# Patient Record
Sex: FEMALE | Race: WHITE | Employment: OTHER
[De-identification: names, ages, dates, MRNs, and addresses within clinical notes are randomized per-mention and may not be internally consistent; named-entity substitution may affect disease eponyms.]

---

## 2020-08-03 ENCOUNTER — TELEPHONE (OUTPATIENT)
Dept: CASE MANAGEMENT | Age: 70
End: 2020-08-03

## 2020-08-03 VITALS — WEIGHT: 135 LBS | BODY MASS INDEX: 21.69 KG/M2 | HEIGHT: 66 IN

## 2020-08-04 ENCOUNTER — HOSPITAL ENCOUNTER (OUTPATIENT)
Dept: CT IMAGING | Age: 70
Discharge: HOME | End: 2020-08-06
Payer: MEDICARE

## 2020-08-04 PROCEDURE — G0297 LDCT FOR LUNG CA SCREEN: HCPCS

## 2020-08-05 ENCOUNTER — TELEPHONE (OUTPATIENT)
Dept: CASE MANAGEMENT | Age: 70
End: 2020-08-05

## 2020-08-05 NOTE — TELEPHONE ENCOUNTER
No call, encounter opened to process CT Lung Screening. CT Lung Screen: 8/4/2020    Impression         Numerous tiny pleural and subpleural nodularities measuring a few    millimeters in size without calcification are probably benign. No    comparison studies.         Bullous emphysema.         Enlarged thyroid gland suggesting goiter. Further workup is needed to    evaluate for mass.         10 mm nodule in the right lobe of liver.         Lung - RADS Category 3 :        Pack years: 24.5    Social History     Tobacco Use  Smoking Status: Current Every Day Smoker    Start Date: 28   Quit Date:    Types: Cigarettes   Packs/Day: 0.50   Years: 49.00   Pack Years: 24.5   Smokeless Tobacco: Never Used         Results letter sent to patient via my chart or mailed.      [address]

## 2021-03-20 ENCOUNTER — IMMUNIZATION (OUTPATIENT)
Dept: PRIMARY CARE CLINIC | Age: 71
End: 2021-03-20
Payer: MEDICARE

## 2021-03-20 PROCEDURE — 91300 COVID-19, PFIZER VACCINE 30MCG/0.3ML DOSE: CPT | Performed by: INTERNAL MEDICINE

## 2021-03-20 PROCEDURE — 0001A COVID-19, PFIZER VACCINE 30MCG/0.3ML DOSE: CPT | Performed by: INTERNAL MEDICINE

## 2021-04-19 ENCOUNTER — IMMUNIZATION (OUTPATIENT)
Dept: PRIMARY CARE CLINIC | Age: 71
End: 2021-04-19
Payer: MEDICARE

## 2021-04-19 PROCEDURE — 91300 COVID-19, PFIZER VACCINE 30MCG/0.3ML DOSE: CPT | Performed by: NURSE PRACTITIONER

## 2021-04-19 PROCEDURE — 0002A COVID-19, PFIZER VACCINE 30MCG/0.3ML DOSE: CPT | Performed by: NURSE PRACTITIONER

## 2021-04-27 ENCOUNTER — HOSPITAL ENCOUNTER (OUTPATIENT)
Dept: PSYCHIATRY | Age: 71
Discharge: HOME | End: 2021-04-27
Payer: MEDICARE

## 2021-04-27 NOTE — FLOWSHEET NOTE
Assessment completed. Client provided verbal consent to participate in the tobacco program via telehealth.     Electronically signed by Reji Luna on 4/27/2021 at 11:22 AM

## 2022-08-01 ENCOUNTER — HOSPITAL ENCOUNTER (OUTPATIENT)
Dept: CT IMAGING | Age: 72
Discharge: HOME | End: 2022-08-03
Payer: MEDICARE

## 2022-08-01 DIAGNOSIS — R91.1 PULMONARY NODULE: ICD-10-CM

## 2022-08-01 PROCEDURE — 71250 CT THORAX DX C-: CPT

## 2022-09-13 ENCOUNTER — HOSPITAL ENCOUNTER (OUTPATIENT)
Dept: NUCLEAR MEDICINE | Age: 72
Discharge: HOME | End: 2022-09-15
Payer: MEDICARE

## 2022-09-13 DIAGNOSIS — R91.1 PULMONARY NODULE: ICD-10-CM

## 2022-09-13 PROCEDURE — A9552 F18 FDG: HCPCS | Performed by: RADIOLOGY

## 2022-09-13 PROCEDURE — 78814 PET IMAGE W/CT LMTD: CPT

## 2022-09-13 PROCEDURE — 3430000000 HC RX DIAGNOSTIC RADIOPHARMACEUTICAL: Performed by: RADIOLOGY

## 2022-09-13 RX ORDER — FLUDEOXYGLUCOSE F 18 200 MCI/ML
15.3 INJECTION, SOLUTION INTRAVENOUS
Status: COMPLETED | OUTPATIENT
Start: 2022-09-13 | End: 2022-09-13

## 2022-09-13 RX ADMIN — FLUDEOXYGLUCOSE F 18 15.3 MILLICURIE: 200 INJECTION, SOLUTION INTRAVENOUS at 09:18

## 2022-11-18 ENCOUNTER — HOSPITAL ENCOUNTER (OUTPATIENT)
Dept: ULTRASOUND IMAGING | Age: 72
Discharge: HOME | End: 2022-11-20
Payer: MEDICARE

## 2022-11-18 DIAGNOSIS — E04.1 THYROID NODULE: ICD-10-CM

## 2022-11-18 PROCEDURE — 10005 FNA BX W/US GDN 1ST LES: CPT

## 2022-11-18 PROCEDURE — 10006 FNA BX W/US GDN EA ADDL: CPT

## 2022-11-18 PROCEDURE — 10005 FNA BX W/US GDN 1ST LES: CPT | Performed by: RADIOLOGY

## 2022-11-18 PROCEDURE — 88173 CYTOPATH EVAL FNA REPORT: CPT

## 2022-11-18 PROCEDURE — 10006 FNA BX W/US GDN EA ADDL: CPT | Performed by: RADIOLOGY

## 2022-11-18 PROCEDURE — 88305 TISSUE EXAM BY PATHOLOGIST: CPT

## 2022-11-18 NOTE — BRIEF OP NOTE
Brief Postoperative Note    Kristal Penaloza  YOB: 1950  [id number]    Pre-operative Diagnosis: bilateral thyroid mass plan bx    Post-operative Diagnosis: Same    Procedure: biopsy    Estimated Blood Loss: < 10 cc    Surgeon: Enolia Sandhoff D.     Complications: none    Specimens obtained: Yes, biopsy of mass    Findings: biopsy of a mass      Inocente Phelan II, MD   11/18/2022 10:05 AM

## 2022-11-18 NOTE — H&P
Interventional Radiology  Attending Pre-operative History and Physical    DIAGNOSIS:  There is no problem list on file for this patient. CHIEF COMPLAINT: <principal problem not specified>        No current outpatient medications on file. No Known Allergies    No past medical history on file. No past surgical history on file. No family history on file. Social History     Socioeconomic History    Marital status:      Spouse name: Not on file    Number of children: Not on file    Years of education: Not on file    Highest education level: Not on file   Occupational History    Not on file   Tobacco Use    Smoking status: Every Day     Packs/day: 0.50     Years: 49.00     Pack years: 24.50     Types: Cigarettes     Start date: 26    Smokeless tobacco: Never   Substance and Sexual Activity    Alcohol use: Not on file    Drug use: Not on file    Sexual activity: Not on file   Other Topics Concern    Not on file   Social History Narrative    Not on file     Social Determinants of Health     Financial Resource Strain: Not on file   Food Insecurity: Not on file   Transportation Needs: Not on file   Physical Activity: Not on file   Stress: Not on file   Social Connections: Not on file   Intimate Partner Violence: Not on file   Housing Stability: Not on file       ROS: Non-contributory other than as noted above    PHYSICAL EXAM:      Heart and Lungs:  demonstrate no contraindications to proceed    DATA:  CBC: No results found for: WBC, RBC, HGB, HCT, MCV, MCH, MCHC, RDW, PLT, MPV  CBC with Differential:  No results found for: WBC, RBC, HGB, HCT, PLT, MCV, MCH, MCHC, RDW, NRBC, SEGSPCT, BANDSPCT, BLASTSPCT, METASPCT, LYMPHOPCT, PROMYELOPCT, MONOPCT, MYELOPCT, EOSPCT, BASOPCT, MONOSABS, LYMPHSABS, EOSABS, BASOSABS, DIFFTYPE  Platelets:  No results found for: PLT  BUN/Creatinine:  No results found for: BUN, CREATININE    ASSESSMENT AND PLAN:  1.   bilateral thyroid mass plan bx  2.   Procedure options, risks and benefits reviewed with patient. Patient expresses understanding.     Electronically signed by Yessi Castellanos II, MD on 11/18/2022 at 10:05 AM

## 2023-01-06 ENCOUNTER — HOSPITAL ENCOUNTER (OUTPATIENT)
Dept: CT IMAGING | Age: 73
End: 2023-01-06
Payer: MEDICARE

## 2023-01-06 DIAGNOSIS — R91.1 PULMONARY NODULE: ICD-10-CM

## 2023-01-06 PROCEDURE — 71250 CT THORAX DX C-: CPT

## 2023-04-20 ENCOUNTER — HOSPITAL ENCOUNTER (OUTPATIENT)
Dept: CT IMAGING | Age: 73
Discharge: HOME | End: 2023-04-22
Payer: MEDICARE

## 2023-04-20 DIAGNOSIS — R91.1 PULMONARY NODULE: ICD-10-CM

## 2023-04-20 PROCEDURE — 71250 CT THORAX DX C-: CPT

## 2023-05-12 ENCOUNTER — HOSPITAL ENCOUNTER (OUTPATIENT)
Dept: PREADMISSION TESTING | Age: 73
Discharge: HOME | End: 2023-05-12
Payer: MEDICARE

## 2023-05-12 VITALS
HEART RATE: 58 BPM | OXYGEN SATURATION: 96 % | BODY MASS INDEX: 26.68 KG/M2 | TEMPERATURE: 97.6 F | RESPIRATION RATE: 18 BRPM | DIASTOLIC BLOOD PRESSURE: 71 MMHG | WEIGHT: 166 LBS | HEIGHT: 66 IN | SYSTOLIC BLOOD PRESSURE: 120 MMHG

## 2023-05-12 DIAGNOSIS — Z01.818 PREOP TESTING: Primary | ICD-10-CM

## 2023-05-12 LAB
EKG ATRIAL RATE: 60 BPM
EKG P AXIS: 25 DEGREES
EKG P-R INTERVAL: 128 MS
EKG Q-T INTERVAL: 442 MS
EKG QRS DURATION: 72 MS
EKG QTC CALCULATION (BAZETT): 442 MS
EKG R AXIS: -15 DEGREES
EKG T AXIS: 11 DEGREES
EKG VENTRICULAR RATE: 60 BPM

## 2023-05-12 PROCEDURE — 93005 ELECTROCARDIOGRAM TRACING: CPT | Performed by: ANESTHESIOLOGY

## 2023-05-12 RX ORDER — PAROXETINE 10 MG/1
10 TABLET, FILM COATED ORAL EVERY MORNING
COMMUNITY

## 2023-05-12 NOTE — PROGRESS NOTES
body piercing jewelry must be removed. Shower the night before surgery with ___Antibacterial soap /RACHEL WIPES________        If you have a Living Will and Durable Power of  for Healthcare, please bring in a copy. If appropriate bring crutches, inspirex, WALKER, CANE etc... Notify your Surgeon if you develop any illness between now and surgery time, cough, cold, fever, sore throat, nausea, vomiting, etc.  Please notify your surgeon if you experience dizziness, shortness of breath or blurred vision between now & the time of your surgery. If you have ___dentures, they will be removed before going to the OR; we will provide you a container. If you wear ___contact lenses or ___glasses, they will be removed; please bring a case for them. To provide excellent care visitors will be limited to 1 in the room at any given time. During flu season no children under the age of 15 are permitted in the hospital for the safety of all patients. Other    Any implantable device requiring remote therapy, Please bring remote day of surgery and bring your implant card with you! Please call AMBULATORY CARE if you have any further questions.    [address]     [address]

## 2023-05-18 ENCOUNTER — ANESTHESIA EVENT (OUTPATIENT)
Dept: OPERATING ROOM | Age: 73
End: 2023-05-18
Payer: MEDICARE

## 2023-05-18 ENCOUNTER — HOSPITAL ENCOUNTER (OUTPATIENT)
Age: 73
Setting detail: OUTPATIENT SURGERY
Discharge: HOME | End: 2023-05-18
Attending: ORTHOPAEDIC SURGERY | Admitting: ORTHOPAEDIC SURGERY
Payer: MEDICARE

## 2023-05-18 ENCOUNTER — ANESTHESIA (OUTPATIENT)
Dept: OPERATING ROOM | Age: 73
End: 2023-05-18
Payer: MEDICARE

## 2023-05-18 VITALS
BODY MASS INDEX: 26.68 KG/M2 | OXYGEN SATURATION: 94 % | RESPIRATION RATE: 15 BRPM | HEART RATE: 52 BPM | WEIGHT: 166 LBS | TEMPERATURE: 97.6 F | HEIGHT: 66 IN | SYSTOLIC BLOOD PRESSURE: 125 MMHG | DIASTOLIC BLOOD PRESSURE: 75 MMHG

## 2023-05-18 DIAGNOSIS — S83.209A ACUTE TORN MENISCUS: Primary | ICD-10-CM

## 2023-05-18 PROCEDURE — 3600000003 HC SURGERY LEVEL 3 BASE: Performed by: ORTHOPAEDIC SURGERY

## 2023-05-18 PROCEDURE — 3700000000 HC ANESTHESIA ATTENDED CARE: Performed by: ORTHOPAEDIC SURGERY

## 2023-05-18 PROCEDURE — 2580000003 HC RX 258: Performed by: ORTHOPAEDIC SURGERY

## 2023-05-18 PROCEDURE — 2709999900 HC NON-CHARGEABLE SUPPLY: Performed by: ORTHOPAEDIC SURGERY

## 2023-05-18 PROCEDURE — 3600000013 HC SURGERY LEVEL 3 ADDTL 15MIN: Performed by: ORTHOPAEDIC SURGERY

## 2023-05-18 PROCEDURE — 6360000002 HC RX W HCPCS: Performed by: ORTHOPAEDIC SURGERY

## 2023-05-18 PROCEDURE — 6360000002 HC RX W HCPCS: Performed by: NURSE ANESTHETIST, CERTIFIED REGISTERED

## 2023-05-18 PROCEDURE — 2500000003 HC RX 250 WO HCPCS: Performed by: ORTHOPAEDIC SURGERY

## 2023-05-18 PROCEDURE — 6360000002 HC RX W HCPCS

## 2023-05-18 PROCEDURE — 7100000000 HC PACU RECOVERY - FIRST 15 MIN: Performed by: ORTHOPAEDIC SURGERY

## 2023-05-18 PROCEDURE — 7100000001 HC PACU RECOVERY - ADDTL 15 MIN: Performed by: ORTHOPAEDIC SURGERY

## 2023-05-18 PROCEDURE — 3700000001 HC ADD 15 MINUTES (ANESTHESIA): Performed by: ORTHOPAEDIC SURGERY

## 2023-05-18 PROCEDURE — 6360000002 HC RX W HCPCS: Performed by: ANESTHESIOLOGY

## 2023-05-18 PROCEDURE — 7100000010 HC PHASE II RECOVERY - FIRST 15 MIN: Performed by: ORTHOPAEDIC SURGERY

## 2023-05-18 PROCEDURE — 7100000011 HC PHASE II RECOVERY - ADDTL 15 MIN: Performed by: ORTHOPAEDIC SURGERY

## 2023-05-18 PROCEDURE — 2580000003 HC RX 258: Performed by: NURSE ANESTHETIST, CERTIFIED REGISTERED

## 2023-05-18 RX ORDER — SODIUM CHLORIDE, SODIUM LACTATE, POTASSIUM CHLORIDE, CALCIUM CHLORIDE 600; 310; 30; 20 MG/100ML; MG/100ML; MG/100ML; MG/100ML
INJECTION, SOLUTION INTRAVENOUS CONTINUOUS PRN
Status: DISCONTINUED | OUTPATIENT
Start: 2023-05-18 | End: 2023-05-18 | Stop reason: SDUPTHER

## 2023-05-18 RX ORDER — KETOROLAC TROMETHAMINE 30 MG/ML
INJECTION, SOLUTION INTRAMUSCULAR; INTRAVENOUS PRN
Status: DISCONTINUED | OUTPATIENT
Start: 2023-05-18 | End: 2023-05-18 | Stop reason: SDUPTHER

## 2023-05-18 RX ORDER — ONDANSETRON 2 MG/ML
4 INJECTION INTRAMUSCULAR; INTRAVENOUS
Status: DISCONTINUED | OUTPATIENT
Start: 2023-05-18 | End: 2023-05-18 | Stop reason: HOSPADM

## 2023-05-18 RX ORDER — FENTANYL CITRATE 50 UG/ML
50 INJECTION, SOLUTION INTRAMUSCULAR; INTRAVENOUS
Status: COMPLETED | OUTPATIENT
Start: 2023-05-18 | End: 2023-05-18

## 2023-05-18 RX ORDER — DEXAMETHASONE SODIUM PHOSPHATE 10 MG/ML
INJECTION, SOLUTION INTRAMUSCULAR; INTRAVENOUS PRN
Status: DISCONTINUED | OUTPATIENT
Start: 2023-05-18 | End: 2023-05-18 | Stop reason: SDUPTHER

## 2023-05-18 RX ORDER — LIDOCAINE HYDROCHLORIDE 20 MG/ML
INJECTION, SOLUTION INTRAVENOUS PRN
Status: DISCONTINUED | OUTPATIENT
Start: 2023-05-18 | End: 2023-05-18 | Stop reason: SDUPTHER

## 2023-05-18 RX ORDER — CEFAZOLIN 2 G/1
INJECTION, POWDER, FOR SOLUTION INTRAMUSCULAR; INTRAVENOUS
Status: DISCONTINUED
Start: 2023-05-18 | End: 2023-05-18 | Stop reason: HOSPADM

## 2023-05-18 RX ORDER — SCOLOPAMINE TRANSDERMAL SYSTEM 1 MG/1
PATCH, EXTENDED RELEASE TRANSDERMAL
Status: DISCONTINUED
Start: 2023-05-18 | End: 2023-05-18 | Stop reason: HOSPADM

## 2023-05-18 RX ORDER — MEPERIDINE HYDROCHLORIDE 25 MG/ML
12.5 INJECTION INTRAMUSCULAR; INTRAVENOUS; SUBCUTANEOUS EVERY 5 MIN PRN
Status: DISCONTINUED | OUTPATIENT
Start: 2023-05-18 | End: 2023-05-18 | Stop reason: HOSPADM

## 2023-05-18 RX ORDER — ONDANSETRON 2 MG/ML
INJECTION INTRAMUSCULAR; INTRAVENOUS PRN
Status: DISCONTINUED | OUTPATIENT
Start: 2023-05-18 | End: 2023-05-18 | Stop reason: SDUPTHER

## 2023-05-18 RX ORDER — HYDROCODONE BITARTRATE AND ACETAMINOPHEN 5; 325 MG/1; MG/1
1 TABLET ORAL EVERY 8 HOURS PRN
Qty: 21 TABLET | Refills: 0 | Status: SHIPPED | OUTPATIENT
Start: 2023-05-18 | End: 2023-05-25

## 2023-05-18 RX ORDER — PROCHLORPERAZINE EDISYLATE 5 MG/ML
5 INJECTION INTRAMUSCULAR; INTRAVENOUS
Status: DISCONTINUED | OUTPATIENT
Start: 2023-05-18 | End: 2023-05-18 | Stop reason: HOSPADM

## 2023-05-18 RX ORDER — SODIUM CHLORIDE 0.9 % (FLUSH) 0.9 %
5-40 SYRINGE (ML) INJECTION EVERY 12 HOURS SCHEDULED
Status: DISCONTINUED | OUTPATIENT
Start: 2023-05-18 | End: 2023-05-18 | Stop reason: HOSPADM

## 2023-05-18 RX ORDER — POVIDONE-IODINE 10 MG/G
OINTMENT TOPICAL PRN
Status: DISCONTINUED | OUTPATIENT
Start: 2023-05-18 | End: 2023-05-18 | Stop reason: HOSPADM

## 2023-05-18 RX ORDER — SCOLOPAMINE TRANSDERMAL SYSTEM 1 MG/1
1 PATCH, EXTENDED RELEASE TRANSDERMAL
Status: CANCELLED | OUTPATIENT
Start: 2023-05-18

## 2023-05-18 RX ORDER — SODIUM CHLORIDE 9 MG/ML
INJECTION, SOLUTION INTRAVENOUS PRN
Status: DISCONTINUED | OUTPATIENT
Start: 2023-05-18 | End: 2023-05-18 | Stop reason: HOSPADM

## 2023-05-18 RX ORDER — PROPOFOL 10 MG/ML
INJECTION, EMULSION INTRAVENOUS PRN
Status: DISCONTINUED | OUTPATIENT
Start: 2023-05-18 | End: 2023-05-18 | Stop reason: SDUPTHER

## 2023-05-18 RX ORDER — MIDAZOLAM HYDROCHLORIDE 1 MG/ML
INJECTION INTRAMUSCULAR; INTRAVENOUS PRN
Status: DISCONTINUED | OUTPATIENT
Start: 2023-05-18 | End: 2023-05-18 | Stop reason: SDUPTHER

## 2023-05-18 RX ORDER — SODIUM CHLORIDE 0.9 % (FLUSH) 0.9 %
5-40 SYRINGE (ML) INJECTION PRN
Status: DISCONTINUED | OUTPATIENT
Start: 2023-05-18 | End: 2023-05-18 | Stop reason: HOSPADM

## 2023-05-18 RX ORDER — SODIUM CHLORIDE 9 MG/ML
INJECTION, SOLUTION INTRAVENOUS CONTINUOUS
Status: DISCONTINUED | OUTPATIENT
Start: 2023-05-18 | End: 2023-05-18 | Stop reason: HOSPADM

## 2023-05-18 RX ORDER — FENTANYL CITRATE 50 UG/ML
INJECTION, SOLUTION INTRAMUSCULAR; INTRAVENOUS
Status: COMPLETED
Start: 2023-05-18 | End: 2023-05-18

## 2023-05-18 RX ORDER — FENTANYL CITRATE 50 UG/ML
INJECTION, SOLUTION INTRAMUSCULAR; INTRAVENOUS PRN
Status: DISCONTINUED | OUTPATIENT
Start: 2023-05-18 | End: 2023-05-18 | Stop reason: SDUPTHER

## 2023-05-18 RX ORDER — LIDOCAINE HYDROCHLORIDE 10 MG/ML
INJECTION, SOLUTION EPIDURAL; INFILTRATION; INTRACAUDAL; PERINEURAL PRN
Status: DISCONTINUED | OUTPATIENT
Start: 2023-05-18 | End: 2023-05-18 | Stop reason: HOSPADM

## 2023-05-18 RX ADMIN — CEFAZOLIN 2000 MG: 2 INJECTION, POWDER, FOR SOLUTION INTRAMUSCULAR; INTRAVENOUS at 09:47

## 2023-05-18 RX ADMIN — FENTANYL CITRATE 50 MCG: 50 INJECTION, SOLUTION INTRAMUSCULAR; INTRAVENOUS at 11:03

## 2023-05-18 RX ADMIN — SODIUM CHLORIDE, POTASSIUM CHLORIDE, SODIUM LACTATE AND CALCIUM CHLORIDE: 600; 310; 30; 20 INJECTION, SOLUTION INTRAVENOUS at 10:31

## 2023-05-18 RX ADMIN — FENTANYL CITRATE 100 MCG: 50 INJECTION, SOLUTION INTRAMUSCULAR; INTRAVENOUS at 09:49

## 2023-05-18 RX ADMIN — DEXAMETHASONE SODIUM PHOSPHATE 10 MG: 10 INJECTION, SOLUTION INTRAMUSCULAR; INTRAVENOUS at 09:49

## 2023-05-18 RX ADMIN — FENTANYL CITRATE 50 MCG: 50 INJECTION, SOLUTION INTRAMUSCULAR; INTRAVENOUS at 11:23

## 2023-05-18 RX ADMIN — LIDOCAINE HYDROCHLORIDE 50 MG: 20 INJECTION, SOLUTION INTRAVENOUS at 09:49

## 2023-05-18 RX ADMIN — KETOROLAC TROMETHAMINE 30 MG: 30 INJECTION, SOLUTION INTRAMUSCULAR; INTRAVENOUS at 10:31

## 2023-05-18 RX ADMIN — MIDAZOLAM 2 MG: 1 INJECTION INTRAMUSCULAR; INTRAVENOUS at 09:40

## 2023-05-18 RX ADMIN — SODIUM CHLORIDE: 9 INJECTION, SOLUTION INTRAVENOUS at 09:12

## 2023-05-18 RX ADMIN — PROPOFOL 150 MG: 10 INJECTION, EMULSION INTRAVENOUS at 09:49

## 2023-05-18 RX ADMIN — ONDANSETRON 4 MG: 2 INJECTION INTRAMUSCULAR; INTRAVENOUS at 10:30

## 2023-05-18 ASSESSMENT — PAIN DESCRIPTION - DESCRIPTORS
DESCRIPTORS: ACHING;BURNING
DESCRIPTORS: ACHING;BURNING

## 2023-05-18 ASSESSMENT — PAIN SCALES - GENERAL
PAINLEVEL_OUTOF10: 7
PAINLEVEL_OUTOF10: 0
PAINLEVEL_OUTOF10: 8

## 2023-05-18 ASSESSMENT — PAIN - FUNCTIONAL ASSESSMENT: PAIN_FUNCTIONAL_ASSESSMENT: 0-10

## 2023-05-18 ASSESSMENT — PAIN DESCRIPTION - ORIENTATION: ORIENTATION: LEFT

## 2023-05-18 ASSESSMENT — PAIN DESCRIPTION - LOCATION
LOCATION: KNEE
LOCATION: KNEE

## 2023-05-18 NOTE — ANESTHESIA POSTPROCEDURE EVALUATION
Department of Anesthesiology  Postprocedure Note    Patient: Roxane Aguilera  MRN: [id number]  YOB: 1950  Date of evaluation: 5/18/2023      Procedure Summary     Date: 05/18/23 Room / Location: [address] [place] [address]    Anesthesia Start: 9609 Anesthesia Stop: 0899    Procedure: LEFT KNEE ARTHROSCOPY WITH REPAIR  MEDIAL MENISCECTOMY (Left) Diagnosis:       Complex tear of medial meniscus of left knee as current injury, initial encounter      (Complex tear of medial meniscus of left knee as current injury, initial encounter [P99.839M])    Surgeons: Yulissa Benjamin DO Responsible Provider: Sara Leonard MD    Anesthesia Type: General ASA Status: 2          Anesthesia Type: General    Dolly Phase I: Dolly Score: 9    Dolly Phase II:        Anesthesia Post Evaluation    Patient location during evaluation: PACU  Patient participation: complete - patient participated  Level of consciousness: awake  Pain score: 4  Airway patency: patent  Nausea & Vomiting: no nausea  Complications: no  Cardiovascular status: blood pressure returned to baseline  Respiratory status: acceptable  Hydration status: euvolemic

## 2023-05-18 NOTE — H&P
Department of Orthopedic Surgery   History & Physical Exam      CHIEF COMPLAINT: Left knee pain      HISTORY OF PRESENT ILLNESS:                The patient is a 81 y.o. female who presents with left knee pain that started several months ago. She states she was getting her mother out of a [employer] lift when she felt a pop in the knee. She states she has had constant pain since that time. She states that the pain has progressively worsened. She complains of pain over the medial joint line. She says it is worse with stairs. She has failed conservative treatment. Past Medical History:        Diagnosis Date    COPD (chronic obstructive pulmonary disease) ([facility] [address]      Past Surgical History:        Procedure Laterality Date    COLONOSCOPY      TONSILLECTOMY      WISDOM TOOTH EXTRACTION       Current Medications:   Current Facility-Administered Medications: 0.9 % sodium chloride infusion, , IntraVENous, Continuous  sodium chloride flush 0.9 % injection 5-40 mL, 5-40 mL, IntraVENous, 2 times per day  sodium chloride flush 0.9 % injection 5-40 mL, 5-40 mL, IntraVENous, PRN  0.9 % sodium chloride infusion, , IntraVENous, PRN  ceFAZolin (ANCEF) 2,000 mg in sterile water 20 mL IV syringe, 2,000 mg, IntraVENous, On Call to OR  povidone-iodine (BETADINE) 10 % ointment, , Topical, PRN  ceFAZolin (ANCEF) 2 g injection, , ,   Allergies:  Patient has no known allergies. Social History:   TOBACCO:   reports that she has quit smoking. Her smoking use included cigarettes. She started smoking about 52 years ago. She has a 24.50 pack-year smoking history. She has never used smokeless tobacco.  ETOH:   reports no history of alcohol use. DRUGS:   reports no history of drug use. ACTIVITIES OF DAILY LIVING:    OCCUPATION:    Family History:   History reviewed. No pertinent family history.     REVIEW OF SYSTEMS:   Skin: no abnormal pigmentation, rash  Eyes: no blurring or eye pain   Ears/Nose/Throat: no hearing loss,

## 2023-05-18 NOTE — ANESTHESIA PRE PROCEDURE
Department of Anesthesiology  Preprocedure Note       Name:  Be Yu   Age:  81 y.o.  :  1950                                          MRN:  [id number]         Date:  2023      Surgeon: Laurence Castillo):  Delonte Johnson DO    Procedure: Procedure(s):  LEFT KNEE ARTHROSCOPY WITH REPAIR  MEDIAL MENISCECTOMY    Medications prior to admission:   Prior to Admission medications    Medication Sig Start Date End Date Taking? Authorizing Provider   PARoxetine (PAXIL) 10 MG tablet Take 1 tablet by mouth every morning    Historical Provider, MD       Current medications:    Current Facility-Administered Medications   Medication Dose Route Frequency Provider Last Rate Last Admin    0.9 % sodium chloride infusion   IntraVENous Continuous Delonte Johnson  mL/hr at 23 0912 New Bag at 23 0912    sodium chloride flush 0.9 % injection 5-40 mL  5-40 mL IntraVENous 2 times per day Delonte Johnson DO        sodium chloride flush 0.9 % injection 5-40 mL  5-40 mL IntraVENous PRN Delonte Johnson DO        0.9 % sodium chloride infusion   IntraVENous PRN Delonte Johnson DO        ceFAZolin (ANCEF) 2,000 mg in sterile water 20 mL IV syringe  2,000 mg IntraVENous On Call to [facility], DO        povidone-iodine (BETADINE) 10 % ointment   Topical PRN Delonte Johnson DO        ceFAZolin (ANCEF) 2 g injection                Allergies:  No Known Allergies    Problem List:  There is no problem list on file for this patient.       Past Medical History:        Diagnosis Date    COPD (chronic obstructive pulmonary disease) ([facility] [address]        Past Surgical History:        Procedure Laterality Date    COLONOSCOPY      TONSILLECTOMY      WISDOM TOOTH EXTRACTION         Social History:    Social History     Tobacco Use    Smoking status: Former     Packs/day: 0.50     Years: 49.00     Pack years: 24.50     Types: Cigarettes     Start date:     Smokeless tobacco:

## 2023-05-18 NOTE — PROGRESS NOTES
CLINICAL PHARMACY NOTE: MEDS TO BEDS    Total # of Prescriptions Filled: 1   The following medications were delivered to the patient:  Norco 5-325 mg    Additional Documentation:'

## 2023-05-18 NOTE — DISCHARGE INSTRUCTIONS
Orthopedics Discharge Instructions   Weight bearing Status - Weight bearing as tolerated - Operative Extermity  Pain medication Per Prescription  Ice to operative/injured site for 15-30 minutes of each hour for next 3-5 days    Elevate operative/injured limb on 2 pillows at home  Continue DVT Prophylaxis as Prescribed  Wound care - Leave dressing in place until follow up  Follow Up in Office in 2 weeks with Dr. Sammy Woods    Call the office for directions or with any questions. Watch for these significant complications. Call physician if they or any other problems occur:  Fever over 101°, redness, swelling or warmth at the operative site  Unrelieved nausea    Foul smelling or cloudy drainage at the operative site   Unrelieved pain    Blood soaked dressing.  (Some oozing may be normal)     Numb, pale, blue, cold or tingling extremity

## 2023-05-19 NOTE — OP NOTE
[address]                                OPERATIVE REPORT    PATIENT NAME: Mc Stewart                  :        1950  MED REC NO:   [id number]                            ROOM:  ACCOUNT NO:   [de-identified]                           ADMIT DATE: 2023  PROVIDER:     Rock Kiser    DATE OF PROCEDURE:  2023    PREOPERATIVE DIAGNOSIS:  Torn meniscus, left knee. POSTOPERATIVE DIAGNOSES:  1. Torn meniscus, left knee. 2.  Synovitis. 3.  Medial and lateral femoral condyle abrasions. OPERATIONS:  1. Arthroscopy of left knee with partial complex subtotal medial and  lateral meniscectomies. 2.  Arthroscopic synovectomy of the medial and lateral compartments of  the left knee. 3.  Arthroscopic debridement of medial and lateral femoral condyle  abrasions of the left knee in chondroplasty fashion. SURGEON:  Rock Rashel DO    OPERATIVE PROCEDURE:  The patient was brought to the operation theater  where a general anesthetic was induced by Department of Anesthesiology. The left knee, leg and foot were prepped and draped in the usual sterile  fashion. The arthroscope was inserted through the inferolateral portal  and the knee joint was distended using lactated Ringer's. We encountered  a large 20-25 mL yellowish effusion upon entering the left knee joint  today. Egress drainage was established through the superomedial portal.  The knee joint was then inspected in a careful and orderly fashion  beginning at the suprapatellar pouch, medial and lateral gutters, and  patellofemoral joint. No bone chips or plica were seen in the  suprapatellar pouch upon inspection today. The kneecap was mildly  arthritic given the patient's age group. ACL and PCL were intact.   She  had complex tears in the medial and lateral meniscus as well as quite a  bit of synovitis in the knee joint today,

## 2023-12-26 ENCOUNTER — OFFICE VISIT (OUTPATIENT)
Dept: FAMILY MEDICINE CLINIC | Age: 73
End: 2023-12-26
Payer: MEDICARE

## 2023-12-26 VITALS
HEART RATE: 66 BPM | TEMPERATURE: 96.9 F | WEIGHT: 166 LBS | HEIGHT: 66 IN | DIASTOLIC BLOOD PRESSURE: 80 MMHG | OXYGEN SATURATION: 96 % | RESPIRATION RATE: 16 BRPM | SYSTOLIC BLOOD PRESSURE: 110 MMHG | BODY MASS INDEX: 26.68 KG/M2

## 2023-12-26 DIAGNOSIS — R09.81 SINUS CONGESTION: ICD-10-CM

## 2023-12-26 DIAGNOSIS — J45.901 ACUTE BRONCHITIS WITH ASTHMA WITH ACUTE EXACERBATION: Primary | ICD-10-CM

## 2023-12-26 DIAGNOSIS — R05.1 ACUTE COUGH: ICD-10-CM

## 2023-12-26 DIAGNOSIS — J20.9 ACUTE BRONCHITIS WITH ASTHMA WITH ACUTE EXACERBATION: Primary | ICD-10-CM

## 2023-12-26 LAB
INFLUENZA A ANTIGEN, POC: NEGATIVE
INFLUENZA B ANTIGEN, POC: NEGATIVE
LOT EXPIRE DATE: NORMAL
LOT KIT NUMBER: NORMAL
SARS-COV-2, POC: NORMAL
VALID INTERNAL CONTROL: NORMAL
VENDOR AND KIT NAME POC: NORMAL

## 2023-12-26 PROCEDURE — 87428 SARSCOV & INF VIR A&B AG IA: CPT | Performed by: NURSE PRACTITIONER

## 2023-12-26 PROCEDURE — 1123F ACP DISCUSS/DSCN MKR DOCD: CPT | Performed by: NURSE PRACTITIONER

## 2023-12-26 PROCEDURE — 99213 OFFICE O/P EST LOW 20 MIN: CPT | Performed by: NURSE PRACTITIONER

## 2023-12-26 RX ORDER — ALBUTEROL SULFATE 90 UG/1
AEROSOL, METERED RESPIRATORY (INHALATION) EVERY 4 HOURS
COMMUNITY

## 2023-12-26 RX ORDER — ALBUTEROL SULFATE 0.63 MG/3ML
1 SOLUTION RESPIRATORY (INHALATION) EVERY 6 HOURS PRN
COMMUNITY

## 2023-12-26 RX ORDER — BENZONATATE 100 MG/1
CAPSULE ORAL
Qty: 30 CAPSULE | Refills: 0 | Status: SHIPPED | OUTPATIENT
Start: 2023-12-26

## 2023-12-26 RX ORDER — PREDNISONE 20 MG/1
20 TABLET ORAL 2 TIMES DAILY
Qty: 10 TABLET | Refills: 0 | Status: SHIPPED | OUTPATIENT
Start: 2023-12-26 | End: 2023-12-31

## 2023-12-26 RX ORDER — ALBUTEROL SULFATE 90 UG/1
AEROSOL, METERED RESPIRATORY (INHALATION)
COMMUNITY
Start: 2021-10-11

## 2023-12-26 RX ORDER — AZITHROMYCIN 250 MG/1
TABLET, FILM COATED ORAL
Qty: 6 TABLET | Refills: 0 | Status: SHIPPED | OUTPATIENT
Start: 2023-12-26

## 2023-12-26 RX ORDER — ASPIRIN 81 MG/1
TABLET, CHEWABLE ORAL EVERY 24 HOURS
COMMUNITY

## 2023-12-26 RX ORDER — ALBUTEROL SULFATE 2.5 MG/3ML
SOLUTION RESPIRATORY (INHALATION) EVERY 6 HOURS
COMMUNITY

## 2023-12-26 NOTE — PROGRESS NOTES
23  Carlos Eduardo Salcedo : 1950 Sex: female  Age 72 y.o. Subjective:  Chief Complaint   Patient presents with    Otalgia     Bilateral ears  started     Congestion    Cough     Coughing up green stuff    Headache       HPI:   Carlos Eduardo Salcedo , 72 y.o. female presents to the clinic for evaluation of sinus congestion x 3 days. The patient also reports cough, LUNA, wheezing, ear discomfort. The patient has taken Mucinex, Albuterol inhaler for symptoms. The patient reports worsening cough over time. The patient reports ill exposure (grand kids). The patient denies headache, sore throat, rash, and fever. The patient also denies chest pain, abdominal pain, and nausea / vomiting / diarrhea. ROS:   Unless otherwise stated in this report the patient's positive and negative responses for review of systems for constitutional, eyes, ENT, cardiovascular, respiratory, gastrointestinal, neurological, , musculoskeletal, and integument systems and related systems to the presenting problem are either stated in the history of present illness or were not pertinent or were negative for the symptoms and/or complaints related to the presenting medical problem. Positives and pertinent negatives as per HPI. All others reviewed and are negative. PMH:     Past Medical History:   Diagnosis Date    COPD (chronic obstructive pulmonary disease) ([address])        Past Surgical History:   Procedure Laterality Date    COLONOSCOPY      KNEE ARTHROSCOPY Left 2023    LEFT KNEE ARTHROSCOPY WITH REPAIR  MEDIAL MENISCECTOMY performed by Carine Lucio DO at [address] [facility] EXTRACTION         History reviewed. No pertinent family history.     Medications:     Current Outpatient Medications:     albuterol (ACCUNEB) 0.63 MG/3ML nebulizer solution, Inhale 3 mLs into the lungs every 6 hours as needed, Disp: , Rfl:     aspirin 81 MG chewable tablet, every 24 hours, Disp: , Rfl:

## 2024-09-06 ENCOUNTER — PROCEDURE VISIT (OUTPATIENT)
Dept: AUDIOLOGY | Age: 74
End: 2024-09-06
Payer: MEDICARE

## 2024-09-06 ENCOUNTER — OFFICE VISIT (OUTPATIENT)
Dept: ENT CLINIC | Age: 74
End: 2024-09-06

## 2024-09-06 VITALS
DIASTOLIC BLOOD PRESSURE: 73 MMHG | BODY MASS INDEX: 26.68 KG/M2 | SYSTOLIC BLOOD PRESSURE: 127 MMHG | WEIGHT: 166 LBS | HEART RATE: 70 BPM | HEIGHT: 66 IN

## 2024-09-06 DIAGNOSIS — H90.3 SENSORINEURAL HEARING LOSS, BILATERAL: Primary | ICD-10-CM

## 2024-09-06 DIAGNOSIS — J30.9 ALLERGIC RHINITIS, UNSPECIFIED SEASONALITY, UNSPECIFIED TRIGGER: ICD-10-CM

## 2024-09-06 DIAGNOSIS — H90.3 SENSORINEURAL HEARING LOSS (SNHL) OF BOTH EARS: ICD-10-CM

## 2024-09-06 DIAGNOSIS — R09.81 NASAL CONGESTION: ICD-10-CM

## 2024-09-06 DIAGNOSIS — R42 DIZZINESS: ICD-10-CM

## 2024-09-06 DIAGNOSIS — R42 VERTIGO: Primary | ICD-10-CM

## 2024-09-06 PROCEDURE — 92557 COMPREHENSIVE HEARING TEST: CPT | Performed by: AUDIOLOGIST

## 2024-09-06 PROCEDURE — 92567 TYMPANOMETRY: CPT | Performed by: AUDIOLOGIST

## 2024-09-06 RX ORDER — FLUTICASONE PROPIONATE 50 MCG
2 SPRAY, SUSPENSION (ML) NASAL DAILY
Qty: 16 G | Refills: 1 | Status: SHIPPED | OUTPATIENT
Start: 2024-09-06

## 2024-09-06 RX ORDER — MONTELUKAST SODIUM 10 MG/1
10 TABLET ORAL DAILY
COMMUNITY
Start: 2024-07-19

## 2024-09-06 ASSESSMENT — ENCOUNTER SYMPTOMS
STRIDOR: 0
RHINORRHEA: 1
RESPIRATORY NEGATIVE: 1
SHORTNESS OF BREATH: 0
EYES NEGATIVE: 1

## 2024-09-06 NOTE — PROGRESS NOTES
EXTRACTION         Current Outpatient Medications:     montelukast (SINGULAIR) 10 MG tablet, Take 1 tablet by mouth daily, Disp: , Rfl:     naproxen (NAPROSYN) 375 MG tablet, , Disp: , Rfl:     albuterol (ACCUNEB) 0.63 MG/3ML nebulizer solution, Inhale 3 mLs into the lungs every 6 hours as needed, Disp: , Rfl:     aspirin 81 MG chewable tablet, every 24 hours, Disp: , Rfl:     BUDESONIDE-FORMOTEROL FUMARATE IN, Inhale into the lungs, Disp: , Rfl:     Cholecalciferol 50 MCG (2000 UT) TABS, every 24 hours, Disp: , Rfl:     UMECLIDINIUM-VILANTEROL IN, Inhale into the lungs, Disp: , Rfl:     azithromycin (ZITHROMAX Z-ALPHONSE) 250 MG tablet, Take 2 tabs on day one, then 1 tab daily for the next 4 days, Disp: 6 tablet, Rfl: 0    benzonatate (TESSALON PERLES) 100 MG capsule, 1-2 tablets every 8 hours as needed for cough, Disp: 30 capsule, Rfl: 0    PARoxetine (PAXIL) 10 MG tablet, Take 1 tablet by mouth every morning, Disp: , Rfl:     albuterol (PROVENTIL) (2.5 MG/3ML) 0.083% nebulizer solution, every 6 hours (Patient not taking: Reported on 12/26/2023), Disp: , Rfl:     albuterol sulfate HFA (PROVENTIL;VENTOLIN;PROAIR) 108 (90 Base) MCG/ACT inhaler, every 4 hours (Patient not taking: Reported on 12/26/2023), Disp: , Rfl:     albuterol sulfate HFA (PROVENTIL;VENTOLIN;PROAIR) 108 (90 Base) MCG/ACT inhaler, Inhale into the lungs (Patient not taking: Reported on 12/26/2023), Disp: , Rfl:   Patient has no known allergies.  Social History     Tobacco Use    Smoking status: Former     Current packs/day: 0.50     Average packs/day: 0.5 packs/day for 53.7 years (26.8 ttl pk-yrs)     Types: Cigarettes     Start date: 1971    Smokeless tobacco: Never   Substance Use Topics    Alcohol use: Never    Drug use: Never     History reviewed. No pertinent family history.    Review of Systems   Constitutional: Negative.  Negative for activity change and appetite change.   HENT:  Positive for congestion, hearing loss, postnasal drip and

## 2024-09-06 NOTE — PROGRESS NOTES
This patient was referred for audiometric and tympanometric testing by LORNA Warren due to  hearing loss and vertigo . Patient reported that she's noticed a decrease in her hearing and episodes of vertigo that make it so she's unable to walk. She stated the onset is random. She reported constant pain in her left ear. She does not perceive she hears better out of one ear versus another. No reported drainage or fullness.    Audiometry using pure tone air and bone conduction testing revealed a borderline normal hearing sensitivity through 500 Hz sloping to a moderate sensorineural hearing loss, bilaterally. Reliability was good. Speech reception thresholds were in good agreement with the pure tone averages, bilaterally. Speech discrimination scores were excellent, bilaterally.    Tympanometry revealed normal middle ear peak pressure and compliance, bilaterally.    The results were reviewed with the patient and ordering provider.     Recommendations for follow up will be made pending ordering provider consult.    Electronically signed by   VARGHESE Huang AuD on 9/6/2024 at 9:51 AM

## 2024-10-09 ENCOUNTER — HOSPITAL ENCOUNTER (OUTPATIENT)
Dept: AUDIOLOGY | Age: 74
Discharge: HOME | End: 2024-10-09
Payer: MEDICARE

## 2024-10-09 PROCEDURE — 92540 BASIC VESTIBULAR EVALUATION: CPT | Performed by: AUDIOLOGIST

## 2024-10-09 PROCEDURE — 92538 CALORIC VSTBLR TEST W/REC: CPT | Performed by: AUDIOLOGIST

## 2024-10-09 NOTE — PROGRESS NOTES
VNG EVALUATION    REASON FOR REFERRAL:  This patient was referred for VNG testing by Franklin Gibson APRN -* due to dizziness.  The patient reported dizziness that comes and goes, especially after bending over or standing up too fast.  She reported earaches bilaterally, but worse on the right.  She has had cataract surgery.  She reported occasionally taking Dramamine for the dizziness.  She followed all pre-test instructions.        RESULTS:  Bithermal caloric irrigations revealed appropriate beating nystagmus in the right ear, warm air.  Fixation suppression was within normal limits.  The patient did not want to continue after the first irrigation due to nausea and not feeling well, therefore no unilateral weakness or directional preponderance could be calculated.   No irregular eye movements were recorded during saccades, or optokinetic testing. Difficulty following the test stimuli was noted during  pendular tracking ans the frequency increased. No significant nystagmus was recorded during gaze testing or positional testing eyes uncovered.  Up-beating nystagmus was noted during positional testing, head right, eyes covered.        IMPRESSION:  Caloric test results were inconclusive today as testing was discontinued. Saccades and optokinetic testing were within normal limits.  Gaze testing was within normal limits.  Pendular tracking and positional testing suggests possible central and peripheral involvement.       If I can be of further assistance or provide additional information, please do not hesitate to contact this office.    Thank you for the referral.      __________________________________  Electronically signed by Arjun Weinberg on 10/9/2024 at 1:14 PM

## 2024-10-10 ENCOUNTER — TELEPHONE (OUTPATIENT)
Dept: ENT CLINIC | Age: 74
End: 2024-10-10

## 2024-10-10 NOTE — TELEPHONE ENCOUNTER
Patient came into office a day early for appointment with SALINA Gibson. Per discussion with registration, patient was advised that the provider was not in this office today and the current provider did not have available time. Patient was unhappy and told registation she wanted the appointment cancelled all together. Will contact patient to discuss.

## 2024-11-19 ENCOUNTER — OFFICE VISIT (OUTPATIENT)
Dept: FAMILY MEDICINE CLINIC | Age: 74
End: 2024-11-19
Payer: MEDICARE

## 2024-11-19 VITALS
WEIGHT: 166 LBS | SYSTOLIC BLOOD PRESSURE: 120 MMHG | TEMPERATURE: 97.3 F | OXYGEN SATURATION: 96 % | HEART RATE: 69 BPM | RESPIRATION RATE: 17 BRPM | HEIGHT: 66 IN | BODY MASS INDEX: 26.68 KG/M2 | DIASTOLIC BLOOD PRESSURE: 60 MMHG

## 2024-11-19 DIAGNOSIS — N39.0 URINARY TRACT INFECTION WITH HEMATURIA, SITE UNSPECIFIED: Primary | ICD-10-CM

## 2024-11-19 DIAGNOSIS — R10.32 LLQ PAIN: ICD-10-CM

## 2024-11-19 DIAGNOSIS — R31.9 URINARY TRACT INFECTION WITH HEMATURIA, SITE UNSPECIFIED: Primary | ICD-10-CM

## 2024-11-19 DIAGNOSIS — R39.9 UTI SYMPTOMS: ICD-10-CM

## 2024-11-19 LAB
BILIRUBIN, POC: NEGATIVE
BLOOD URINE, POC: ABNORMAL
CLARITY, POC: ABNORMAL
COLOR, POC: YELLOW
GLUCOSE URINE, POC: NEGATIVE MG/DL
KETONES, POC: NEGATIVE MG/DL
LEUKOCYTE EST, POC: ABNORMAL
NITRITE, POC: NEGATIVE
PH, POC: 5.5
PROTEIN, POC: ABNORMAL MG/DL
SPECIFIC GRAVITY, POC: 1.02
UROBILINOGEN, POC: 0.2 MG/DL

## 2024-11-19 PROCEDURE — 1160F RVW MEDS BY RX/DR IN RCRD: CPT | Performed by: NURSE PRACTITIONER

## 2024-11-19 PROCEDURE — 1123F ACP DISCUSS/DSCN MKR DOCD: CPT | Performed by: NURSE PRACTITIONER

## 2024-11-19 PROCEDURE — 1159F MED LIST DOCD IN RCRD: CPT | Performed by: NURSE PRACTITIONER

## 2024-11-19 PROCEDURE — 81002 URINALYSIS NONAUTO W/O SCOPE: CPT | Performed by: NURSE PRACTITIONER

## 2024-11-19 PROCEDURE — 99214 OFFICE O/P EST MOD 30 MIN: CPT | Performed by: NURSE PRACTITIONER

## 2024-11-19 RX ORDER — CEFDINIR 300 MG/1
300 CAPSULE ORAL 2 TIMES DAILY
Qty: 14 CAPSULE | Refills: 0 | Status: SHIPPED | OUTPATIENT
Start: 2024-11-19 | End: 2024-11-26

## 2024-11-19 RX ORDER — BROMPHENIRAMINE MALEATE, PSEUDOEPHEDRINE HYDROCHLORIDE, AND DEXTROMETHORPHAN HYDROBROMIDE 2; 30; 10 MG/5ML; MG/5ML; MG/5ML
SYRUP ORAL
COMMUNITY
End: 2024-11-19

## 2024-11-19 RX ORDER — AMOXICILLIN 500 MG/1
CAPSULE ORAL
COMMUNITY
End: 2024-11-19

## 2024-11-19 NOTE — PROGRESS NOTES
24  Maureen Daniel : 1950 Sex: female  Age 73 y.o.    Subjective:  Chief Complaint   Patient presents with    Urinary Pain    Urinary Frequency     Symptoms started Saturday.       HPI:   Maureen Daniel , 73 y.o. female presents to the clinic for evaluation of UTI symptoms x 3 days. Reports associated dysuria, frequency, urgency, and suprapubic discomfort. The patient has taken Tylenol for symptoms. The patient reports worsening symptoms over time. Denies hematuria, flank pain, vaginal discharge, vaginal bleeding, lethargy. The patient also denies headache, fever, chest pain, abdominal pain, shortness of breath, and nausea / vomiting / diarrhea. No LMP recorded. Patient is postmenopausal.    ROS:   Unless otherwise stated in this report the patient's positive and negative responses for review of systems for constitutional, eyes, ENT, cardiovascular, respiratory, gastrointestinal, neurological, , musculoskeletal, and integument systems and related systems to the presenting problem are either stated in the history of present illness or were not pertinent or were negative for the symptoms and/or complaints related to the presenting medical problem.  Positives and pertinent negatives as per HPI.  All others reviewed and are negative.      PMH:     Past Medical History:   Diagnosis Date    Asthma     COPD (chronic obstructive pulmonary disease) ([facility])        Past Surgical History:   Procedure Laterality Date    COLONOSCOPY      KNEE ARTHROSCOPY Left 2023    LEFT KNEE ARTHROSCOPY WITH REPAIR  MEDIAL MENISCECTOMY performed by Franklin Vernon DO at [facility] OR    TONSILLECTOMY      WISDOM TOOTH EXTRACTION         History reviewed. No pertinent family history.    Medications:     Current Outpatient Medications:     cefdinir (OMNICEF) 300 MG capsule, Take 1 capsule by mouth 2 times daily for 7 days, Disp: 14 capsule, Rfl: 0    montelukast (SINGULAIR) 10 MG tablet, Take 1 tablet by mouth daily,

## 2024-11-21 LAB
CULTURE: ABNORMAL
SPECIMEN DESCRIPTION: ABNORMAL

## 2025-09-05 ENCOUNTER — OFFICE VISIT (OUTPATIENT)
Dept: FAMILY MEDICINE CLINIC | Age: 75
End: 2025-09-05

## 2025-09-05 VITALS
HEART RATE: 62 BPM | OXYGEN SATURATION: 96 % | BODY MASS INDEX: 26.68 KG/M2 | DIASTOLIC BLOOD PRESSURE: 60 MMHG | SYSTOLIC BLOOD PRESSURE: 110 MMHG | WEIGHT: 166 LBS | HEIGHT: 66 IN | RESPIRATION RATE: 17 BRPM | TEMPERATURE: 97.6 F

## 2025-09-05 DIAGNOSIS — J20.9 ACUTE BRONCHITIS WITH ASTHMA WITH ACUTE EXACERBATION: Primary | ICD-10-CM

## 2025-09-05 DIAGNOSIS — J02.9 SORE THROAT: ICD-10-CM

## 2025-09-05 DIAGNOSIS — R05.9 COUGH, UNSPECIFIED TYPE: ICD-10-CM

## 2025-09-05 DIAGNOSIS — R68.89 FLU-LIKE SYMPTOMS: ICD-10-CM

## 2025-09-05 DIAGNOSIS — J45.901 ACUTE BRONCHITIS WITH ASTHMA WITH ACUTE EXACERBATION: Primary | ICD-10-CM

## 2025-09-05 LAB
INFLUENZA A ANTIBODY: NORMAL
INFLUENZA B ANTIBODY: NORMAL
Lab: NORMAL
PERFORMING INSTRUMENT: NORMAL
QC PASS/FAIL: NORMAL
S PYO AG THROAT QL: NORMAL
SARS-COV-2, POC: NORMAL

## 2025-09-05 RX ORDER — BENZONATATE 100 MG/1
100 CAPSULE ORAL 3 TIMES DAILY PRN
Qty: 21 CAPSULE | Refills: 0 | Status: SHIPPED | OUTPATIENT
Start: 2025-09-05 | End: 2025-09-12

## 2025-09-05 RX ORDER — PREDNISONE 10 MG/1
TABLET ORAL
Qty: 18 TABLET | Refills: 0 | Status: SHIPPED | OUTPATIENT
Start: 2025-09-05 | End: 2025-09-14

## 2025-09-05 RX ORDER — CEFDINIR 300 MG/1
300 CAPSULE ORAL EVERY 12 HOURS
Qty: 20 CAPSULE | Refills: 0 | Status: SHIPPED | OUTPATIENT
Start: 2025-09-05 | End: 2025-09-15

## (undated) DEVICE — SOLUTION IRRIG 3000ML LAC RINGERS ARTHROMTC PLAS CONT

## (undated) DEVICE — BANDAGE COMPR W6INXL5YD SELF ADH COHESIVE CO FLX

## (undated) DEVICE — NEEDLE SPNL L3.5IN PNK HUB S STL REG WALL FIT STYL W/ QNCKE

## (undated) DEVICE — MARKER,SKIN,WI/RULER AND LABELS: Brand: MEDLINE

## (undated) DEVICE — DRESSING PETRO W3XL3IN OIL EMUL N ADH GZ KNIT IMPREG CELOS

## (undated) DEVICE — BANDAGE COMPR W6XL12FT SGL LAYERED NO CLSR EXSANGUATION

## (undated) DEVICE — GLOVE ORTHO 8   MSG9480

## (undated) DEVICE — NDL CNTR 40CT FM MAG: Brand: MEDLINE INDUSTRIES, INC.

## (undated) DEVICE — PAD,ABDOMINAL,8"X10",ST,LF: Brand: MEDLINE

## (undated) DEVICE — COVER,LIGHT HANDLE,FLX,1/PK: Brand: MEDLINE INDUSTRIES, INC.

## (undated) DEVICE — TUBING PMP L16FT MAIN DISP FOR AR-6400 AR-6475

## (undated) DEVICE — BANDAGE COMPR W6INXL12FT SMOOTH FOR LIMB EXSANG ESMARCH

## (undated) DEVICE — SHEET,DRAPE,40X58,STERILE: Brand: MEDLINE

## (undated) DEVICE — CANNULA IV 18GA L15IN BLNT FILL LUERLOCK HUB MJCT

## (undated) DEVICE — GAUZE,SPONGE,4"X4",16PLY,XRAY,STRL,LF: Brand: MEDLINE

## (undated) DEVICE — BASIC DOUBLE BASIN 2-LF: Brand: MEDLINE INDUSTRIES, INC.

## (undated) DEVICE — GOWN,SIRUS,POLYRNF,BRTHSLV,XLN/XL,20/CS: Brand: MEDLINE

## (undated) DEVICE — HYPODERMIC SAFETY NEEDLE: Brand: MAGELLAN

## (undated) DEVICE — BLADE,STAINLESS-STEEL,11,STRL,DISPOSABLE: Brand: MEDLINE

## (undated) DEVICE — APPLICATOR MEDICATED 26 CC SOLUTION HI LT ORNG CHLORAPREP

## (undated) DEVICE — PADDING,UNDERCAST,COTTON, 4"X4YD STERILE: Brand: MEDLINE

## (undated) DEVICE — GAUZE,SPONGE,4"X4",16PLY,STRL,LF,10/TRAY: Brand: MEDLINE

## (undated) DEVICE — PACK,EXTREMITY,ORTHOMAX,5/CS: Brand: MEDLINE

## (undated) DEVICE — SYRINGE MED 10ML LUERLOCK TIP W/O SFTY DISP

## (undated) DEVICE — BANDAGE COMPR W4INXL5YD WHT BGE POLY COT M E WRP WV HK AND

## (undated) DEVICE — TOWEL,OR,DSP,ST,BLUE,STD,6/PK,12PK/CS: Brand: MEDLINE

## (undated) DEVICE — CLOTH SURG PREP PREOPERATIVE CHLORHEXIDINE GLUC 2% READYPREP

## (undated) DEVICE — TUBING, SUCTION, 1/4" X 10', STRAIGHT: Brand: MEDLINE